# Patient Record
Sex: MALE | Race: WHITE | NOT HISPANIC OR LATINO | Employment: FULL TIME | URBAN - METROPOLITAN AREA
[De-identification: names, ages, dates, MRNs, and addresses within clinical notes are randomized per-mention and may not be internally consistent; named-entity substitution may affect disease eponyms.]

---

## 2017-01-11 ENCOUNTER — HOSPITAL ENCOUNTER (EMERGENCY)
Facility: HOSPITAL | Age: 42
Discharge: HOME | End: 2017-01-11
Attending: EMERGENCY MEDICINE | Admitting: EMERGENCY MEDICINE

## 2017-01-11 ENCOUNTER — APPOINTMENT (OUTPATIENT)
Dept: GENERAL RADIOLOGY | Facility: HOSPITAL | Age: 42
End: 2017-01-11

## 2017-01-11 VITALS
OXYGEN SATURATION: 99 % | SYSTOLIC BLOOD PRESSURE: 144 MMHG | DIASTOLIC BLOOD PRESSURE: 92 MMHG | BODY MASS INDEX: 33.37 KG/M2 | HEIGHT: 74 IN | RESPIRATION RATE: 19 BRPM | TEMPERATURE: 96.7 F | HEART RATE: 69 BPM | WEIGHT: 260 LBS

## 2017-01-11 DIAGNOSIS — M25.512 ACUTE PAIN OF LEFT SHOULDER: Primary | ICD-10-CM

## 2017-01-11 LAB
ALBUMIN SERPL-MCNC: 4.6 G/DL (ref 3.5–5.2)
ALBUMIN/GLOB SERPL: 1.6 G/DL
ALP SERPL-CCNC: 70 U/L (ref 39–117)
ALT SERPL W P-5'-P-CCNC: 57 U/L (ref 1–41)
ANION GAP SERPL CALCULATED.3IONS-SCNC: 12.7 MMOL/L
AST SERPL-CCNC: 35 U/L (ref 1–40)
BASOPHILS # BLD AUTO: 0.02 10*3/MM3 (ref 0–0.2)
BASOPHILS NFR BLD AUTO: 0.3 % (ref 0–1.5)
BILIRUB SERPL-MCNC: 0.5 MG/DL (ref 0.1–1.2)
BUN BLD-MCNC: 18 MG/DL (ref 6–20)
BUN/CREAT SERPL: 15.4 (ref 7–25)
CALCIUM SPEC-SCNC: 9.6 MG/DL (ref 8.6–10.5)
CHLORIDE SERPL-SCNC: 101 MMOL/L (ref 98–107)
CO2 SERPL-SCNC: 27.3 MMOL/L (ref 22–29)
CREAT BLD-MCNC: 1.17 MG/DL (ref 0.76–1.27)
DEPRECATED RDW RBC AUTO: 42.1 FL (ref 37–54)
EOSINOPHIL # BLD AUTO: 0.08 10*3/MM3 (ref 0–0.7)
EOSINOPHIL NFR BLD AUTO: 1.2 % (ref 0.3–6.2)
ERYTHROCYTE [DISTWIDTH] IN BLOOD BY AUTOMATED COUNT: 12.8 % (ref 11.5–14.5)
GFR SERPL CREATININE-BSD FRML MDRD: 69 ML/MIN/1.73
GLOBULIN UR ELPH-MCNC: 2.8 GM/DL
GLUCOSE BLD-MCNC: 101 MG/DL (ref 65–99)
HCT VFR BLD AUTO: 45.1 % (ref 40.4–52.2)
HGB BLD-MCNC: 15.3 G/DL (ref 13.7–17.6)
HOLD SPECIMEN: NORMAL
IMM GRANULOCYTES # BLD: 0 10*3/MM3 (ref 0–0.03)
IMM GRANULOCYTES NFR BLD: 0 % (ref 0–0.5)
LYMPHOCYTES # BLD AUTO: 2.55 10*3/MM3 (ref 0.9–4.8)
LYMPHOCYTES NFR BLD AUTO: 39.3 % (ref 19.6–45.3)
MCH RBC QN AUTO: 30.9 PG (ref 27–32.7)
MCHC RBC AUTO-ENTMCNC: 33.9 G/DL (ref 32.6–36.4)
MCV RBC AUTO: 91.1 FL (ref 79.8–96.2)
MONOCYTES # BLD AUTO: 0.39 10*3/MM3 (ref 0.2–1.2)
MONOCYTES NFR BLD AUTO: 6 % (ref 5–12)
NEUTROPHILS # BLD AUTO: 3.45 10*3/MM3 (ref 1.9–8.1)
NEUTROPHILS NFR BLD AUTO: 53.2 % (ref 42.7–76)
PLATELET # BLD AUTO: 233 10*3/MM3 (ref 140–500)
PMV BLD AUTO: 11 FL (ref 6–12)
POTASSIUM BLD-SCNC: 4.3 MMOL/L (ref 3.5–5.2)
PROT SERPL-MCNC: 7.4 G/DL (ref 6–8.5)
RBC # BLD AUTO: 4.95 10*6/MM3 (ref 4.6–6)
SODIUM BLD-SCNC: 141 MMOL/L (ref 136–145)
TROPONIN T SERPL-MCNC: <0.01 NG/ML (ref 0–0.03)
TROPONIN T SERPL-MCNC: <0.01 NG/ML (ref 0–0.03)
WBC NRBC COR # BLD: 6.49 10*3/MM3 (ref 4.5–10.7)
WHOLE BLOOD HOLD SPECIMEN: NORMAL

## 2017-01-11 PROCEDURE — 80053 COMPREHEN METABOLIC PANEL: CPT | Performed by: EMERGENCY MEDICINE

## 2017-01-11 PROCEDURE — 93010 ELECTROCARDIOGRAM REPORT: CPT | Performed by: INTERNAL MEDICINE

## 2017-01-11 PROCEDURE — 85025 COMPLETE CBC W/AUTO DIFF WBC: CPT | Performed by: EMERGENCY MEDICINE

## 2017-01-11 PROCEDURE — 84484 ASSAY OF TROPONIN QUANT: CPT | Performed by: EMERGENCY MEDICINE

## 2017-01-11 PROCEDURE — 71020 HC CHEST PA AND LATERAL: CPT

## 2017-01-11 PROCEDURE — 93005 ELECTROCARDIOGRAM TRACING: CPT | Performed by: EMERGENCY MEDICINE

## 2017-01-11 PROCEDURE — 36415 COLL VENOUS BLD VENIPUNCTURE: CPT | Performed by: EMERGENCY MEDICINE

## 2017-01-11 PROCEDURE — 99283 EMERGENCY DEPT VISIT LOW MDM: CPT

## 2017-01-11 RX ORDER — SODIUM CHLORIDE 0.9 % (FLUSH) 0.9 %
10 SYRINGE (ML) INJECTION AS NEEDED
Status: DISCONTINUED | OUTPATIENT
Start: 2017-01-11 | End: 2017-01-12 | Stop reason: HOSPADM

## 2017-01-11 NOTE — ED NOTES
Patient complains of left shoulder/arm pain that started about 15 minutes ago and lasted for about 10 minutes. Denied any injury and N/V and chest pain. States that he called his doctor and they told him to come to the ER that he might be having a heart attack. Patient is in no apparent distress at this time.      Joanne Donis RN  01/11/17 8950

## 2017-01-12 NOTE — ED PROVIDER NOTES
EMERGENCY DEPARTMENT ENCOUNTER    CHIEF COMPLAINT  Chief Complaint: Arm Pain  History given by: Patient  History limited by: n/a  Room Number: HALE/E  PMD: Elizabeth Mcdonough MD    HPI:  Pt is a 41 y.o. male who presents complaining of left shoulder, left arm pain onset at 1615. Patient was anxious that he could be having a heart attack and called his PCP who referred him to the ER. He still has the pain at this time, it is reproducible with movement of his left arm/shoulder. He has had no chest pain, SOB, N/V, diaphoresis. Patient recently started running. He has had no exertional chest pain or SOB with exercising. Patient is a nonsmoker. Patient reports that he had a benign stress test and echo about 5 years ago.     Duration: 5 hours  Timing: constant  Location: left shoulder  Radiation: None  Quality: Did not specify  Intensity/Severity: Moderate  Associated Symptoms: None  Aggravating Factors: Movement of left arm reproduces pain  Alleviating Factors: None  Previous Episodes: None  Treatment before arrival: None    PAST MEDICAL HISTORY  Active Ambulatory Problems     Diagnosis Date Noted   • No Active Ambulatory Problems     Resolved Ambulatory Problems     Diagnosis Date Noted   • No Resolved Ambulatory Problems     No Additional Past Medical History       PAST SURGICAL HISTORY  History reviewed. No pertinent past surgical history.    FAMILY HISTORY  History reviewed. No pertinent family history.    SOCIAL HISTORY  Social History     Social History   • Marital status:      Spouse name: N/A   • Number of children: N/A   • Years of education: N/A     Occupational History   • Not on file.     Social History Main Topics   • Smoking status: Former Smoker   • Smokeless tobacco: Not on file   • Alcohol use Yes      Comment: occationally    • Drug use: No   • Sexual activity: Defer     Other Topics Concern   • Not on file     Social History Narrative   • No narrative on file       ALLERGIES  Review of  patient's allergies indicates no known allergies.    REVIEW OF SYSTEMS  Review of Systems   Constitutional: Negative for chills, fever and unexpected weight change.   HENT: Negative for congestion, rhinorrhea and sore throat.    Respiratory: Negative for cough and shortness of breath.    Cardiovascular: Negative for chest pain and leg swelling.   Gastrointestinal: Negative for abdominal pain, blood in stool, diarrhea, nausea and vomiting.   Musculoskeletal: Negative for back pain, gait problem and neck pain.        Left shoulder pain   Skin: Negative.  Negative for rash.   Neurological: Negative for weakness and headaches.   All other systems reviewed and are negative.      PHYSICAL EXAM  ED Triage Vitals   Temp Heart Rate Resp BP SpO2   01/11/17 1652 01/11/17 1652 01/11/17 1652 01/11/17 1654 01/11/17 1652   96.7 °F (35.9 °C) 77 16 161/103 97 %      Temp src Heart Rate Source Patient Position BP Location FiO2 (%)   01/11/17 1652 01/11/17 1652 01/11/17 1654 01/11/17 1654 --   Tympanic Monitor Sitting Right arm        Physical Exam   Constitutional: He is oriented to person, place, and time and well-developed, well-nourished, and in no distress.   HENT:   Head: Normocephalic and atraumatic.   Eyes: EOM are normal. Pupils are equal, round, and reactive to light.   Neck: Normal range of motion. Neck supple.   Cardiovascular: Normal rate, regular rhythm and normal heart sounds.    Pulmonary/Chest: Effort normal and breath sounds normal. No respiratory distress.   Abdominal: Soft. There is no tenderness. There is no rebound and no guarding.   Musculoskeletal: Normal range of motion. He exhibits no edema.        Left shoulder: He exhibits pain (reproduces pain with movement of left arm/shoulder).   Neurological: He is alert and oriented to person, place, and time. He has normal sensation and normal strength.   Skin: Skin is warm and dry.   Psychiatric: Mood and affect normal.   Nursing note and vitals reviewed.      LAB  RESULTS  Recent Results (from the past 24 hour(s))   Comprehensive Metabolic Panel    Collection Time: 01/11/17  5:50 PM   Result Value Ref Range    Glucose 101 (H) 65 - 99 mg/dL    BUN 18 6 - 20 mg/dL    Creatinine 1.17 0.76 - 1.27 mg/dL    Sodium 141 136 - 145 mmol/L    Potassium 4.3 3.5 - 5.2 mmol/L    Chloride 101 98 - 107 mmol/L    CO2 27.3 22.0 - 29.0 mmol/L    Calcium 9.6 8.6 - 10.5 mg/dL    Total Protein 7.4 6.0 - 8.5 g/dL    Albumin 4.60 3.50 - 5.20 g/dL    ALT (SGPT) 57 (H) 1 - 41 U/L    AST (SGOT) 35 1 - 40 U/L    Alkaline Phosphatase 70 39 - 117 U/L    Total Bilirubin 0.5 0.1 - 1.2 mg/dL    eGFR Non African Amer 69 >60 mL/min/1.73    Globulin 2.8 gm/dL    A/G Ratio 1.6 g/dL    BUN/Creatinine Ratio 15.4 7.0 - 25.0    Anion Gap 12.7 mmol/L   Troponin    Collection Time: 01/11/17  5:50 PM   Result Value Ref Range    Troponin T <0.010 0.000 - 0.030 ng/mL   Light Blue Top    Collection Time: 01/11/17  5:50 PM   Result Value Ref Range    Extra Tube hold for add-on    Gold Top - SST    Collection Time: 01/11/17  5:50 PM   Result Value Ref Range    Extra Tube Hold for add-ons.    CBC Auto Differential    Collection Time: 01/11/17  5:50 PM   Result Value Ref Range    WBC 6.49 4.50 - 10.70 10*3/mm3    RBC 4.95 4.60 - 6.00 10*6/mm3    Hemoglobin 15.3 13.7 - 17.6 g/dL    Hematocrit 45.1 40.4 - 52.2 %    MCV 91.1 79.8 - 96.2 fL    MCH 30.9 27.0 - 32.7 pg    MCHC 33.9 32.6 - 36.4 g/dL    RDW 12.8 11.5 - 14.5 %    RDW-SD 42.1 37.0 - 54.0 fl    MPV 11.0 6.0 - 12.0 fL    Platelets 233 140 - 500 10*3/mm3    Neutrophil % 53.2 42.7 - 76.0 %    Lymphocyte % 39.3 19.6 - 45.3 %    Monocyte % 6.0 5.0 - 12.0 %    Eosinophil % 1.2 0.3 - 6.2 %    Basophil % 0.3 0.0 - 1.5 %    Immature Grans % 0.0 0.0 - 0.5 %    Neutrophils, Absolute 3.45 1.90 - 8.10 10*3/mm3    Lymphocytes, Absolute 2.55 0.90 - 4.80 10*3/mm3    Monocytes, Absolute 0.39 0.20 - 1.20 10*3/mm3    Eosinophils, Absolute 0.08 0.00 - 0.70 10*3/mm3    Basophils, Absolute  0.02 0.00 - 0.20 10*3/mm3    Immature Grans, Absolute 0.00 0.00 - 0.03 10*3/mm3   Troponin    Collection Time: 01/11/17  9:02 PM   Result Value Ref Range    Troponin T <0.010 0.000 - 0.030 ng/mL     I ordered the above labs and reviewed the results    RADIOLOGY  XR Chest 2 View   Final Result   Negative chest x-ray.       This report was finalized on 1/11/2017 9:22 PM by Dr. Anderson German MD.             I ordered the above noted radiological studies. Interpreted by radiologist. Reviewed by me in PACS.     PROCEDURES  Procedures    EKG           EKG time: 1715  Rhythm/Rate: NSR, 63  P waves and WA: Normal  QRS, axis: LAD, normal QRS  ST and T waves: Normal     Interpreted Contemporaneously by me, independently viewed  No prior EKG for comparison    EKG    #2       EKG time: 2058  Rhythm/Rate: NSR rate 62  P waves and WA: Normal  QRS, axis: LAD, normal QRS   ST and T waves: Normal     Interpreted Contemporaneously by me, independently viewed  unchanged compared to prior today        PROGRESS AND CONSULTS  ED Course   Comment By Time   10:00 PM  Patient with left shoulder pain.  No chest pain.  Doubt cardiac.  Able to reproduce pain with certain movements.  Serial troponins and ECGS are unchanged.  Not exertional.  Will discharge home.  Follow up with [facility].  Return for any concerns. Terry Fry MD 01/11 2202 2056 - Labs, CXR, EKG thus far are benign. Will repeat EKG as patient continues to have pain and repeat Troponin.    2159 - Rechecked patient. Patient resting comfortably, no distress. Discussed repeat EKG and repeat Troponin are negative. Agrees with plan to discharge the patient to f/u with cardiology for a stress test.    MEDICAL DECISION MAKING  Results were reviewed/discussed with the patient and they were also made aware of online access. Pt also made aware that some labs, such as cultures, will not be resulted during ER visit and follow up with PMD is necessary.      MDM  Number of Diagnoses or Management Options     Amount and/or Complexity of Data Reviewed  Clinical lab tests: ordered and reviewed  Tests in the radiology section of CPT®: ordered and reviewed  Tests in the medicine section of CPT®: ordered and reviewed    Patient Progress  Patient progress: stable    HEART Score  History: Slightly suspicious (+0)  ECG: Normal (+0)  Age: Less than 45 (+0)  Risk Factors: No risk factors known (+0)  Troponin: Normal limit or lower (+0)  Total: 0        DIAGNOSIS  Final diagnoses:   Acute pain of left shoulder       DISPOSITION  DISCHARGE    Patient discharged in stable condition.    Reviewed implications of results, diagnosis, meds, responsibility to follow up, warning signs and symptoms of possible worsening, potential complications and reasons to return to ER.    Patient/Family voiced understanding of above instructions.    Discussed plan for discharge, as there is no emergent indication for admission.  Pt/family is agreeable and understands need for follow up and repeat testing.  Pt is aware that discharge does not mean that nothing is wrong but it indicates no emergency is present that requires admission and they must continue care with follow-up as given below or physician of their choice.     FOLLOW-UP  Elizabeth Mcdonough MD  [address]  [place] [address]  [phone]    Schedule an appointment as soon as possible for a visit      [facility]  [address]  [place] [address] [phone]  Schedule an appointment as soon as possible for a visit           Medication List      Notice     No changes were made to your prescriptions during this visit.        Latest Documented Vital Signs:  As of 10:39 PM  BP- 144/92 HR- 69 Temp- 96.7 °F (35.9 °C) (Tympanic) O2 sat- 99%    --  Documentation assistance provided by pat Hyman for Dr Fry.  Information recorded by the pat was done  at my direction and has been verified and validated by me.     Tate Hyman  01/11/17 7161       Terry Fry MD  01/11/17 7616

## 2020-02-26 ENCOUNTER — OFFICE VISIT (OUTPATIENT)
Dept: SLEEP MEDICINE | Facility: HOSPITAL | Age: 45
End: 2020-02-26

## 2020-02-26 VITALS
DIASTOLIC BLOOD PRESSURE: 92 MMHG | HEART RATE: 83 BPM | HEIGHT: 74 IN | SYSTOLIC BLOOD PRESSURE: 148 MMHG | WEIGHT: 273 LBS | OXYGEN SATURATION: 97 % | BODY MASS INDEX: 35.04 KG/M2

## 2020-02-26 DIAGNOSIS — G47.10 HYPERSOMNIA: Primary | ICD-10-CM

## 2020-02-26 DIAGNOSIS — R06.83 SNORING: ICD-10-CM

## 2020-02-26 DIAGNOSIS — R06.81 WITNESSED EPISODE OF APNEA: ICD-10-CM

## 2020-02-26 DIAGNOSIS — E66.9 OBESITY (BMI 30-39.9): ICD-10-CM

## 2020-02-26 PROCEDURE — G0463 HOSPITAL OUTPT CLINIC VISIT: HCPCS

## 2020-02-26 NOTE — PROGRESS NOTES
"[facility]  Telephone: [phone] / Fax: [phone] [place]  Telephone: [phone] / Fax: [phone] [place]    Referring Physician:  Elizabeth Mcdonough MD  PCP: Elizabeth Mcdonough MD    Reason for consult:  sleep apnea    Keith Armendariz is a 44 y.o.male  was seen in the [facility] today for evaluation of sleep apnea.  He reports poor sleep quality since  progressive weight gain of 60 lbs since 1999.  He c/o loud snoring and witnessed apneas. Snoring occurs in all positions. He has more symptoms on his back.  He feels tired despite increasing sleep time. He reports 1 instance of gasping for breath when he had a cold.  He reports excessive sweating at night. He feels that his sleep is very restless. He gives history of palpitations/anxiety. His sleep schedule is 11pm-6am. ESS is 6.    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    ROS-+frequent urination, +post nasal drip, +swollen ankles/joints, +SOA.    Keith Armendariz -no medical problems.    Current Medications:  None    I have reviewed Past Medical History, Past Surgical History, Medication List, Social History and Family History as entered in Sleep Questionnaire and EPIC.    ESS  6   Vital Signs /92   Pulse 83   Ht 188 cm (74\")   Wt 124 kg (273 lb)   SpO2 97%   BMI 35.05 kg/m²  Body mass index is 35.05 kg/m².    General Alert and oriented. No acute distress noted   Pharynx/Throat Class IV Mallampati airway, large tongue, no evidence of redundant lateral pharyngeal tissue. No oral lesions. No thrush. Moist mucous membranes.   Head Normocephalic. Symmetrical. Atraumatic.    Nose No septal deviation. No drainage   Chest Wall Normal shape. Symmetric expansion with respiration. No tenderness.   Neck Trachea midline, no thyromegaly or adenopathy    Lungs Clear to auscultation bilaterally. No wheezes. No rhonchi. No rales. Respirations regular, even and unlabored.   Heart Regular rhythm " and normal rate. Normal S1 and S2. No murmur   Abdomen Soft, non-tender and non-distended. Normal bowel sounds. No masses.   Extremities Moves all extremities well. No edema   Psychiatric Normal mood and affect.           Impression:  1. Hypersomnia    2. Snoring    3. Obesity (BMI 30-39.9)    4. Witnessed episode of apnea          Plan:  I discussed the pathophysiology of obstructive sleep apnea with the patient.  We discussed the adverse outcomes associated with untreated sleep-disordered breathing.  We discussed treatment modalities of obstructive sleep apnea including CPAP device as well as oral mandibular advancement device. Sleep study will be scheduled to establish definitive diagnosis of sleep disorder breathing.  Weight loss will be strongly beneficial in order to reduce the severity of sleep-disordered breathing.  Patient has narrow oropharyngeal structure.  Caution during activities that require prolonged concentration is strongly advised.  Patient will be notified of sleep study results after sleep study is completed.  If sleep apnea is only mild,  oral mandibular advancement device may be one of the treatment options.  However if sleep apnea is moderately severe, CPAP treatment will be strongly encouraged.  The patient is not opposed to treatment with CPAP device if we confirm significant obstructive sleep apnea on polysomnography.     Thank you for allowing me to participate in your patient's care.    The patient will follow up with Dr. Haywood after completion of HST      ARCENIO Jasso  [facility]  Phone: [phone]      Part of this note may be an electronic transcription/translation of spoken language to printed text using the Dragon Dictation System. Some errors may exist even though the document was edited.

## 2020-03-17 ENCOUNTER — HOSPITAL ENCOUNTER (OUTPATIENT)
Dept: SLEEP MEDICINE | Facility: HOSPITAL | Age: 45
Discharge: HOME | End: 2020-03-17
Admitting: NURSE PRACTITIONER

## 2020-03-17 DIAGNOSIS — R06.83 SNORING: ICD-10-CM

## 2020-03-17 DIAGNOSIS — E66.9 OBESITY (BMI 30-39.9): ICD-10-CM

## 2020-03-17 DIAGNOSIS — G47.10 HYPERSOMNIA: ICD-10-CM

## 2020-03-17 PROCEDURE — 95806 SLEEP STUDY UNATT&RESP EFFT: CPT

## 2020-04-02 ENCOUNTER — TELEPHONE (OUTPATIENT)
Dept: SLEEP MEDICINE | Facility: HOSPITAL | Age: 45
End: 2020-04-02

## 2020-04-02 NOTE — TELEPHONE ENCOUNTER
LM for patient to call a discuss results.  Patient was found negative for PAUL with 2.6 AHI but 4.8 AHI while supine.  Ashley would like for patient to participate in an in lab study with mostly supine sleep as she suspects the home study may have underestimated the severity of his respiratory events.

## 2020-04-03 ENCOUNTER — TELEPHONE (OUTPATIENT)
Dept: SLEEP MEDICINE | Facility: HOSPITAL | Age: 45
End: 2020-04-03

## 2020-04-03 NOTE — TELEPHONE ENCOUNTER
Discussed home study results with patient and scheduled him for in lab nocturnal polysomnogram on 7/17/2020 at 8:30pm for further evaluation.

## 2020-04-07 DIAGNOSIS — G47.33 OBSTRUCTIVE SLEEP APNEA: Primary | ICD-10-CM

## 2020-04-07 RX ORDER — ZOLPIDEM TARTRATE 5 MG/1
TABLET ORAL
Qty: 2 TABLET | Refills: 0 | Status: SHIPPED | OUTPATIENT
Start: 2020-04-07

## 2020-05-22 ENCOUNTER — TELEPHONE (OUTPATIENT)
Dept: SLEEP MEDICINE | Facility: HOSPITAL | Age: 45
End: 2020-05-22

## 2020-05-22 NOTE — TELEPHONE ENCOUNTER
----- Message from Nicko Dobbs sent at 5/22/2020 11:18 AM EDT -----  Regarding: RE: In lab study order.  I have left 3 messages now with pt regarding pharmacy information. So [place] [facility], pt has not returned any of the phone calls  ----- Message -----  From: Tavo Haywood MD  Sent: 4/7/2020   7:43 PM EDT  To: Nicko Dobbs  Subject: RE: In lab study order.                          Ordered in-lab study ; however no pharmacy - please call in [facility]    ----- Message -----  From: Jose Ramon Hart  Sent: 4/6/2020   8:57 AM EDT  To: Tavo Haywood MD  Subject: In lab study order.                              Need order for In Lab study.      Thank you

## 2020-05-22 NOTE — TELEPHONE ENCOUNTER
----- Message from Tavo Haywood MD sent at 4/7/2020  7:43 PM EDT -----  Regarding: RE: In lab study order.  Ordered in-lab study ; however no pharmacy - please call in [facility]    ----- Message -----  From: Jose Ramon Hart  Sent: 4/6/2020   8:57 AM EDT  To: Tavo Haywood MD  Subject: In lab study order.                              Need order for In Lab study.      Thank you

## 2020-05-26 ENCOUNTER — TELEPHONE (OUTPATIENT)
Dept: SLEEP MEDICINE | Facility: HOSPITAL | Age: 45
End: 2020-05-26

## 2020-05-26 NOTE — TELEPHONE ENCOUNTER
----- Message from Nicko Dobbs sent at 5/22/2020 11:18 AM EDT -----  Regarding: RE: In lab study order.  I have left 3 messages now with pt regarding pharmacy information. So we can call in the [facility] for the sleep study, pt has not returned any of the phone calls  ----- Message -----  From: Tavo Haywood MD  Sent: 4/7/2020   7:43 PM EDT  To: Nicko Dobbs  Subject: RE: In lab study order.                          Ordered in-lab study ; however no pharmacy - please call in [facility]    ----- Message -----  From: Jose Ramon Hart  Sent: 4/6/2020   8:57 AM EDT  To: Tavo Haywood MD  Subject: In lab study order.                              Need order for In Lab study.      Thank you

## 2020-07-17 ENCOUNTER — HOSPITAL ENCOUNTER (OUTPATIENT)
Dept: SLEEP MEDICINE | Facility: HOSPITAL | Age: 45
Discharge: HOME | End: 2020-07-17